# Patient Record
Sex: FEMALE | Race: AMERICAN INDIAN OR ALASKA NATIVE
[De-identification: names, ages, dates, MRNs, and addresses within clinical notes are randomized per-mention and may not be internally consistent; named-entity substitution may affect disease eponyms.]

---

## 2019-11-25 ENCOUNTER — HOSPITAL ENCOUNTER (EMERGENCY)
Dept: HOSPITAL 5 - ED | Age: 19
Discharge: HOME | End: 2019-11-25
Payer: MEDICAID

## 2019-11-25 VITALS — DIASTOLIC BLOOD PRESSURE: 80 MMHG | SYSTOLIC BLOOD PRESSURE: 110 MMHG
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
## 2019-11-25 DIAGNOSIS — S39.012A: Primary | ICD-10-CM
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
## 2019-11-25 DIAGNOSIS — Z79.899: ICD-10-CM

## 2019-11-25 DIAGNOSIS — N89.8: ICD-10-CM
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
## 2019-11-25 LAB
BACTERIA #/AREA URNS HPF: (no result) /HPF
PH UR STRIP: 7 [PH] (ref 5–7)
RBC #/AREA URNS HPF: 30 /HPF (ref 0–6)
UROBILINOGEN UR-MCNC: < 2 MG/DL (ref ?–2)
WBC #/AREA URNS HPF: 4 /HPF (ref 0–6)

## 2019-11-25 PROCEDURE — 81025 URINE PREGNANCY TEST: CPT

## 2019-11-25 PROCEDURE — 81001 URINALYSIS AUTO W/SCOPE: CPT

## 2019-11-25 NOTE — EMERGENCY DEPARTMENT REPORT
ED Female  HPI





- General


Chief complaint: Urogenital-Female


Stated complaint: VAGINAL DISCOMFORT/LOWER BACK


Time Seen by Provider: 11/25/19 19:46


Source: patient


Mode of arrival: Ambulatory


Limitations: No Limitations





- History of Present Illness


Initial comments: 





Charlotte is a very pleasant healthy 19-year-old female without significant past 

medical history who presents with vaginal irritation, foul-smelling urine and 

lower back pain.  The vaginal discomfort irritation began gradually 2-3 days 

ago.  She has a thick discharge.  Foul-smelling urine noticed today.  She had 

transient lower back pain this morning.  The pain is not persistent.  Back pain 

has since resolved.  Denies hematuria.  Denies pelvic pain.  Denies abdominal 

pain. Denies vomiting.  Denies fever.


MD Complaint: vaginal discharge


-: Gradual, days(s) (2-3)


Location: labia


Severity: mild


Quality: burning


Consistency: constant


Improves with: none


Worsens with: none


Associated Symptoms: vaginal discharge





- Related Data


                                  Previous Rx's











 Medication  Instructions  Recorded  Last Taken  Type


 


Fluconazole [Diflucan TAB] 150 mg PO ONCE #1 tablet 11/25/19 Unknown Rx


 


metroNIDAZOLE [Flagyl TAB] 500 mg PO Q12HR 7 Days #14 tab 11/25/19 Unknown Rx











                                    Allergies











Allergy/AdvReac Type Severity Reaction Status Date / Time


 


No Known Allergies Allergy   Verified 11/25/19 19:47














ED Review of Systems


ROS: 


Stated complaint: VAGINAL DISCOMFORT/LOWER BACK


Other details as noted in HPI





Comment: All other systems reviewed and negative


Constitutional: denies: fever, malaise


Respiratory: denies: cough


Cardiovascular: denies: chest pain


Gastrointestinal: denies: abdominal pain, nausea, vomiting


Genitourinary: discharge


Musculoskeletal: back pain





ED Past Medical Hx





- Past Medical History


Previous Medical History?: No





- Surgical History


Additional Surgical History: C SECTION





- Social History


Smoking Status: Never Smoker


Substance Use Type: None





- Medications


Home Medications: 


                                Home Medications











 Medication  Instructions  Recorded  Confirmed  Last Taken  Type


 


Fluconazole [Diflucan TAB] 150 mg PO ONCE #1 tablet 11/25/19  Unknown Rx


 


metroNIDAZOLE [Flagyl TAB] 500 mg PO Q12HR 7 Days #14 tab 11/25/19  Unknown Rx














ED Physical Exam





- General


Limitations: No Limitations


General appearance: alert, in no apparent distress





- Head


Head exam: Present: atraumatic, normocephalic





- Eye


Eye exam: Present: normal appearance





- ENT


ENT exam: Present: mucous membranes moist





- Neck


Neck exam: Present: normal inspection, full ROM





- Respiratory


Respiratory exam: Present: normal lung sounds bilaterally.  Absent: respiratory 

distress, wheezes, rales, rhonchi





- Cardiovascular


Cardiovascular Exam: Present: regular rate, normal rhythm, normal heart sounds. 

Absent: systolic murmur, diastolic murmur, rubs, gallop





- GI/Abdominal


GI/Abdominal exam: Present: soft, normal bowel sounds.  Absent: distended, te

nderness, guarding





- Extremities Exam


Extremities exam: Present: normal inspection





- Back Exam


Back exam: Present: normal inspection, full ROM.  Absent: tenderness, CVA 

tenderness (R), CVA tenderness (L), muscle spasm, paraspinal tenderness





- Neurological Exam


Neurological exam: Present: alert, oriented X3





- Psychiatric


Psychiatric exam: Present: normal affect, normal mood





- Skin


Skin exam: Present: warm, dry, intact, normal color.  Absent: rash





ED Course





                                   Vital Signs











  11/25/19





  19:46


 


Temperature 98.6 F


 


Pulse Rate 76


 


Respiratory 20





Rate 


 


Blood Pressure 110/80





[Right] 


 


O2 Sat by Pulse 99





Oximetry 














ED Medical Decision Making





- Medical Decision Making





1.  vaginitis, no indication of PID rx: fluconazole, metronidazole





2.  back pain:  lumbar strain, no pain currently





3.  Urine malodorous: UA negative for infection: contaminated


Critical care attestation.: 


If time is entered above; I have spent that time in minutes in the direct care 

of this critically ill patient, excluding procedure time.








ED Disposition


Clinical Impression: 


 Vaginitis, Lumbar strain





Disposition: DC-01 TO HOME OR SELFCARE


Is pt being admited?: No


Does the pt Need Aspirin: No


Condition: Stable


Instructions:  Vaginitis (ED)


Prescriptions: 


Fluconazole [Diflucan TAB] 150 mg PO ONCE #1 tablet


metroNIDAZOLE [Flagyl TAB] 500 mg PO Q12HR 7 Days #14 tab


Referrals: 


[facility] [Outside] - 3-5 Days


Forms:  Work/School Release Form(ED)

## 2019-11-25 NOTE — EVENT NOTE
ED Screening Note


ED Screening Note: 





lower back pain that began two to three days ago 


+vaginal discharge 


no dysuria 


no fever 


LNMP: 11/4/19





no pmhx 


no allergies to meds 





This initial assessment/diagnostic orders/clinical plan/treatment(s) is/are 

subject to change based on patients health status, clinical progression and re-

assessment by fellow clinical providers in the ED. Further treatment and workup 

at subsequent clinical providers discretion. Patient/guardian urged not to elope

from the ED as their condition may be serious if not clinically assessed and 

managed. 





Initial orders include: UA, urine preg

## 2021-06-19 ENCOUNTER — HOSPITAL ENCOUNTER (EMERGENCY)
Dept: HOSPITAL 5 - ED | Age: 21
Discharge: HOME | End: 2021-06-19
Payer: MEDICAID

## 2021-06-19 VITALS — SYSTOLIC BLOOD PRESSURE: 106 MMHG | DIASTOLIC BLOOD PRESSURE: 69 MMHG

## 2021-06-19 DIAGNOSIS — R10.84: Primary | ICD-10-CM

## 2021-06-19 DIAGNOSIS — Z79.899: ICD-10-CM

## 2021-06-19 DIAGNOSIS — Z98.890: ICD-10-CM

## 2021-06-19 LAB
ALBUMIN SERPL-MCNC: 4.5 G/DL (ref 3.9–5)
ALT SERPL-CCNC: 7 UNITS/L (ref 7–56)
BASOPHILS # (AUTO): 0 K/MM3 (ref 0–0.1)
BASOPHILS NFR BLD AUTO: 0.3 % (ref 0–1.8)
BILIRUB UR QL STRIP: (no result)
BLOOD UR QL VISUAL: (no result)
BUN SERPL-MCNC: 11 MG/DL (ref 7–17)
BUN/CREAT SERPL: 16 %
CALCIUM SERPL-MCNC: 9.3 MG/DL (ref 8.4–10.2)
EOSINOPHIL # BLD AUTO: 0 K/MM3 (ref 0–0.4)
EOSINOPHIL NFR BLD AUTO: 0.3 % (ref 0–4.3)
HCT VFR BLD CALC: 31.4 % (ref 30.3–42.9)
HEMOLYSIS INDEX: 0
HGB BLD-MCNC: 10.5 GM/DL (ref 10.1–14.3)
LYMPHOCYTES # BLD AUTO: 1.4 K/MM3 (ref 1.2–5.4)
LYMPHOCYTES NFR BLD AUTO: 18.5 % (ref 13.4–35)
MCHC RBC AUTO-ENTMCNC: 33 % (ref 30–34)
MCV RBC AUTO: 81 FL (ref 79–97)
MONOCYTES # (AUTO): 0.7 K/MM3 (ref 0–0.8)
MONOCYTES % (AUTO): 9 % (ref 0–7.3)
MUCOUS THREADS #/AREA URNS HPF: (no result) /HPF
PH UR STRIP: 5 [PH] (ref 5–7)
PLATELET # BLD: 291 K/MM3 (ref 140–440)
PROT UR STRIP-MCNC: (no result) MG/DL
RBC # BLD AUTO: 3.88 M/MM3 (ref 3.65–5.03)
RBC #/AREA URNS HPF: 3 /HPF (ref 0–6)
UROBILINOGEN UR-MCNC: 2 MG/DL (ref ?–2)
WBC #/AREA URNS HPF: 1 /HPF (ref 0–6)

## 2021-06-19 PROCEDURE — 83735 ASSAY OF MAGNESIUM: CPT

## 2021-06-19 PROCEDURE — 81025 URINE PREGNANCY TEST: CPT

## 2021-06-19 PROCEDURE — 81001 URINALYSIS AUTO W/SCOPE: CPT

## 2021-06-19 PROCEDURE — 85025 COMPLETE CBC W/AUTO DIFF WBC: CPT

## 2021-06-19 PROCEDURE — 36415 COLL VENOUS BLD VENIPUNCTURE: CPT

## 2021-06-19 PROCEDURE — 80053 COMPREHEN METABOLIC PANEL: CPT

## 2021-06-19 PROCEDURE — 83690 ASSAY OF LIPASE: CPT

## 2021-06-19 NOTE — EMERGENCY DEPARTMENT REPORT
ED General Adult HPI





- General


Chief complaint: Abdominal Pain


Stated complaint: ABD PAINS


Time Seen by Provider: 21 13:31


Source: patient


Mode of arrival: Ambulatory


Limitations: No Limitations





- History of Present Illness


Initial comments: 


20-year-old female patient (; LMP end of May 2021) presents to the emergency

department with complaints of abdominal pain starting yesterday.  Pain is 

diffuse and intermittent, radiating to both flanks.  No known sick contacts.  No

current steroid or antibiotic use.  Six days ago, patient took Plan B.  Patient 

also took Motrin yesterday with limited relief.  No history of prior abdominal 

surgeries.  Denies fever, chills, nausea, vomiting, diarrhea, constipation, 

vaginal discharge.  Denies all other complaints at this time.





- Related Data


                                  Previous Rx's











 Medication  Instructions  Recorded  Last Taken  Type


 


Fluconazole (Nf) [Diflucan TAB] 150 mg PO ONCE #1 tablet 19 Unknown Rx


 


metroNIDAZOLE [Flagyl TAB] 500 mg PO Q12HR 7 Days #14 tab 19 Unknown Rx











                                    Allergies











Allergy/AdvReac Type Severity Reaction Status Date / Time


 


No Known Allergies Allergy   Verified 19 19:47














ED Review of Systems


ROS: 


Stated complaint: ABD PAINS


Other details as noted in HPI





Other: 





GENERAL: Negative for fever. 


CARDIOVASCULAR: Negative for chest pain. 


PULMONARY: Negative for shortness of breath. 


GASTROINTESTINAL: Positive for abdominal pain. 


MUSCULOSKELETAL: Negative for back pain. 


NEUROLOGICAL: Negative for headache. 


INTEGUMENTARY: Negative for rash.





ED Past Medical Hx





- Past Medical History


Previous Medical History?: No





- Surgical History


Past Surgical History?: Yes


Additional Surgical History: C SECTION





- Social History


Smoking Status: Never Smoker


Substance Use Type: None





- Medications


Home Medications: 


                                Home Medications











 Medication  Instructions  Recorded  Confirmed  Last Taken  Type


 


Fluconazole (Nf) [Diflucan TAB] 150 mg PO ONCE #1 tablet 19  Unknown Rx


 


metroNIDAZOLE [Flagyl TAB] 500 mg PO Q12HR 7 Days #14 tab 19  Unknown Rx














ED Physical Exam





- General


Limitations: No Limitations





- Other


Other exam information: 





General: Awake and alert. No acute distress. 


Head: Atraumatic, normocephalic.


Eyes: EOMI. Pupils are equal and round. Normal sclera and conjunctiva. 


ENT: Oral mucosa is moist. Normal pharyngeal exam.


Neck: Supple. No lymphadenopathy.


Pulmonary: No respiratory distress. Clear to auscultation bilaterally. 


Cardiac: Regular rate and rhythm. Pulses are palpable and equal bilaterally. No 

lower extremity cyanosis or edema. 


Skin: Warm and dry. No rashes. 


Abdomen: Soft, non-protuberant.  Patient reports diffuse abdominal pain without 

reproducible localized tenderness.  No guarding, rigidity, or rebound. Bowel 

sounds are normal. No organomegaly or masses noted. 


Back: Normal alignment. No CVA tenderness.


Extremities: Symmetrical. Full range of motion intact. 


Neurological: Alert and oriented, appropriately interactive, no focal deficits.


Psych: Cooperative. Appropriate mood and affect. Speech is evenly metered. 

Thoughts are logically construed.





ED Course


                                   Vital Signs











  21





  13:23


 


Temperature 98.5 F


 


Pulse Rate 85


 


Respiratory 16





Rate 


 


Blood Pressure 106/69


 


O2 Sat by Pulse 99





Oximetry 














ED Medical Decision Making





- Lab Data


Result diagrams: 


                                 21 14:14





                                 21 14:14





- Medical Decision Making


Differential diagnosis including but not limited to: pregnancy, urinary tract 

infection, pyelonephritis, pancreatitis, appendicitis 





On reevaluation, patient remains stable. Repeat abdominal exam is benign. Labs 

and urinalysis are unremarkable. Pregnancy test is negative. She is afebrile, 

hemodynamically stable, tolerating oral intake without difficulty. No vomiting 

in the emergency department. Ambulatory without assistance. Etiology of 

patient's abdominal pain is unclear however there is no clinical indication for 

further diagnostic work-up on an emergent basis at this time. Patient will be 

discharged home to continue symptomatic treatment and referred to primary care 

provider for close outpatient follow-up. Patient expressed understanding and is 

agreeable to plan of care. Strict return precautions provided.





Repeat exam is unremarkable and benign. History, exam, diagnostic testing, and 

current condition do not suggest worrisome pathology to warrant further testing,

continued ED treatment, admission, or surgical evaluation at this point. Given 

the low probability of a significant medical illness, it would be more likely to

result in harm than benefit to perform further testing at this stage. Discussed 

findings, presumptive diagnosis, need for follow-up and specific signs/symptoms 

that should prompt immediate return to the emergency department. Instructions 

were explained in detail to the patient in addition to giving written discharge 

information. Patient expressed understanding and was given the opportunity to 

ask questions, all of which were satisfactorily answered prior to discharge 

home.





Critical care attestation.: 


If time is entered above; I have spent that time in minutes in the direct care 

of this critically ill patient, excluding procedure time.








ED Disposition


Clinical Impression: 


 Nonspecific abdominal pain





Disposition: DC-01 TO HOME OR SELFCARE


Is pt being admited?: No


Does the pt Need Aspirin: No


Condition: Stable


Instructions:  Abdominal Pain, Adult, Easy-to-Read, Abdominal Pain (ED)


Additional Instructions: 


Take Tylenol every 4 hours and Motrin every 8 hours as needed for pain.


Rest.  Drink plenty of fluids.


Maintain a healthy diet.


Follow-up with primary care provider this week.  Call Monday to schedule an 

appointment.  See referral information below.


Return to the emergency department immediately for new or worsening symptoms.


Specifically, return to the emergency department immediately for fever, 

vomiting, black/bloody stools, or if pain localizes to the right lower part of 

your abdomen.


Referrals: 


[facility] [place] [Primary Care Provider] - 3-5 Days


Time of Disposition: 15:38

## 2022-08-04 ENCOUNTER — HOSPITAL ENCOUNTER (EMERGENCY)
Dept: HOSPITAL 5 - ED | Age: 22
Discharge: HOME | End: 2022-08-04
Payer: MEDICAID

## 2022-08-04 VITALS — SYSTOLIC BLOOD PRESSURE: 133 MMHG | DIASTOLIC BLOOD PRESSURE: 93 MMHG

## 2022-08-04 DIAGNOSIS — R10.30: Primary | ICD-10-CM

## 2022-08-04 LAB
ALBUMIN SERPL-MCNC: 4.5 G/DL (ref 3.9–5)
ALT SERPL-CCNC: 17 UNITS/L (ref 7–56)
BUN SERPL-MCNC: 14 MG/DL (ref 7–17)
BUN/CREAT SERPL: 18 %
CALCIUM SERPL-MCNC: 9.5 MG/DL (ref 8.4–10.2)
HCT VFR BLD CALC: 40.3 % (ref 30.3–42.9)
HEMOLYSIS INDEX: 84
HGB BLD-MCNC: 13 GM/DL (ref 10.1–14.3)
MCHC RBC AUTO-ENTMCNC: 32 % (ref 30–34)
MCV RBC AUTO: 84 FL (ref 79–97)
PH UR STRIP: 8 [PH] (ref 5–7)
PLATELET # BLD: 296 K/MM3 (ref 140–440)
PROT UR STRIP-MCNC: (no result) MG/DL
RBC # BLD AUTO: 4.78 M/MM3 (ref 3.65–5.03)
UROBILINOGEN UR-MCNC: 0 MG/DL (ref ?–2)

## 2022-08-04 PROCEDURE — 99283 EMERGENCY DEPT VISIT LOW MDM: CPT

## 2022-08-04 PROCEDURE — 81025 URINE PREGNANCY TEST: CPT

## 2022-08-04 PROCEDURE — 80053 COMPREHEN METABOLIC PANEL: CPT

## 2022-08-04 PROCEDURE — 83690 ASSAY OF LIPASE: CPT

## 2022-08-04 PROCEDURE — 85027 COMPLETE CBC AUTOMATED: CPT

## 2022-08-04 PROCEDURE — 36415 COLL VENOUS BLD VENIPUNCTURE: CPT

## 2022-08-04 PROCEDURE — 81001 URINALYSIS AUTO W/SCOPE: CPT

## 2022-08-04 NOTE — EMERGENCY DEPARTMENT REPORT
ED Abdominal Pain HPI





- General


Chief Complaint: Abdominal Pain


Stated Complaint: STOMACH PAIN


Time Seen by Provider: 08/04/22 20:20


Source: patient


Mode of arrival: Ambulatory


Limitations: No Limitations





- History of Present Illness


Initial Comments: 





pt is a 22 y/o aaf with nmn who presents of bilat lower abd pain  3/10 x 3 days 

, pt denies n/v no fever or chills no dysuria , frequency or urgency , no 

vaginal discharge, LMP 1 month ago, pt having normal bowel movements. . pt is 

tolerating po intake.  symptoms are exacerbated by nothing, symptoms are 

relieved by nothing, there is no back pain , pt denies fall injury or trauma.  


MD Complaint: abdominal pain





- Related Data


                                  Previous Rx's











 Medication  Instructions  Recorded  Last Taken  Type


 


Fluconazole (Nf) [Diflucan TAB] 150 mg PO ONCE #1 tablet 11/25/19 Unknown Rx


 


metroNIDAZOLE [Flagyl TAB] 500 mg PO Q12HR 7 Days #14 tab 11/25/19 Unknown Rx


 


Ibuprofen [Motrin 800 MG tab] 800 mg PO Q8HR PRN #30 tablet 08/04/22 Unknown Rx


 


Ondansetron [Zofran Odt] 4 mg PO Q8HR #12 tab.rapdis 08/04/22 Unknown Rx











                                    Allergies











Allergy/AdvReac Type Severity Reaction Status Date / Time


 


No Known Allergies Allergy   Verified 08/04/22 16:18














ED Review of Systems


ROS: 


Stated complaint: STOMACH PAIN


Other details as noted in HPI





Constitutional: denies: chills, fever


Eyes: denies: eye pain, eye discharge, vision change


ENT: denies: ear pain, throat pain


Respiratory: denies: cough, shortness of breath, wheezing


Cardiovascular: denies: chest pain, palpitations


Endocrine: no symptoms reported


Gastrointestinal: abdominal pain.  denies: nausea, vomiting, diarrhea, 

constipation, hematemesis, melena, hematochezia


Genitourinary: denies: urgency, dysuria, frequency, hematuria, discharge, 

dyspareunia


Musculoskeletal: denies: back pain, joint swelling, arthralgia


Skin: as per HPI


Neurological: denies: headache, weakness, paresthesias, vertigo


Psychiatric: denies: anxiety, depression


Hematological/Lymphatic: denies: easy bleeding, easy bruising





ED Past Medical Hx





- Surgical History


Additional Surgical History: C SECTION





- Social History


Smoking Status: Never Smoker


Substance Use Type: None





- Medications


Home Medications: 


                                Home Medications











 Medication  Instructions  Recorded  Confirmed  Last Taken  Type


 


Fluconazole (Nf) [Diflucan TAB] 150 mg PO ONCE #1 tablet 11/25/19  Unknown Rx


 


metroNIDAZOLE [Flagyl TAB] 500 mg PO Q12HR 7 Days #14 tab 11/25/19  Unknown Rx


 


Ibuprofen [Motrin 800 MG tab] 800 mg PO Q8HR PRN #30 tablet 08/04/22  Unknown Rx


 


Ondansetron [Zofran Odt] 4 mg PO Q8HR #12 tab.rapdis 08/04/22  Unknown Rx














ED Physical Exam





- General


Limitations: No Limitations


General appearance: alert, in no apparent distress





- Head


Head exam: Present: normocephalic, normal inspection





- Eye


Eye exam: Present: EOMI


Pupils: Present: normal accommodation





- ENT


ENT exam: Present: mucous membranes moist





- Neck


Neck exam: Present: normal inspection, full ROM.  Absent: tenderness





- Respiratory


Respiratory exam: Present: normal lung sounds bilaterally.  Absent: respiratory 

distress, wheezes





- Cardiovascular


Cardiovascular Exam: Present: regular rate, normal rhythm, normal heart sounds. 

Absent: systolic murmur, diastolic murmur, rubs, gallop





- GI/Abdominal


GI/Abdominal exam: Present: soft, normal bowel sounds.  Absent: distended, 

tenderness, guarding, rebound, rigid, bruit, hernia





- Rectal


Rectal exam: Present: deferred





- Extremities Exam


Extremities exam: Present: normal inspection, full ROM, normal capillary refill.

 Absent: tenderness, pedal edema





- Back Exam


Back exam: Present: normal inspection, full ROM.  Absent: CVA tenderness (R), C

VA tenderness (L)





- Neurological Exam


Neurological exam: Present: alert, oriented X3, CN II-XII intact, normal gait





- Expanded Neurological Exam


  ** Expanded


Patient oriented to: Present: person, place, time


Speech: Present: fluid speech


Motor strength exam: RUE: 5, LUE: 5, RLE: 5, LLE: 5


Best Eye Response ([place]): (4) open spontaneously


Best Motor Response ([place]): (6) obeys commands


Best Verbal Response ([place]): (5) oriented


[place] Total: 15





- Psychiatric


Psychiatric exam: Present: normal affect, normal mood





- Skin


Skin exam: Present: warm, dry, intact, normal color.  Absent: rash





ED Course





                                   Vital Signs











  08/04/22





  16:18


 


Temperature 98.3 F


 


Pulse Rate 71


 


Respiratory 18





Rate 


 


Blood Pressure 133/93





[Left] 


 


O2 Sat by Pulse 100





Oximetry 














ED Medical Decision Making





- Lab Data


Result diagrams: 


                                 08/04/22 17:28





                                 08/04/22 17:28








Labs











  08/04/22 08/04/22 08/04/22





  16:18 17:28 17:28


 


WBC   6.4 


 


RBC   4.78 


 


Hgb   13.0 


 


Hct   40.3 


 


MCV   84 


 


MCH   27 L 


 


MCHC   32 


 


RDW   16.6 H 


 


Plt Count   296 


 


Sodium    136 L


 


Potassium    4.5


 


Chloride    99.3


 


Carbon Dioxide    23


 


Anion Gap    18


 


BUN    14


 


Creatinine    0.8


 


Estimated GFR    > 60


 


BUN/Creatinine Ratio    18


 


Glucose    88


 


Calcium    9.5


 


Total Bilirubin    0.80


 


AST    27


 


ALT    17


 


Alkaline Phosphatase    69


 


Total Protein    8.3 H


 


Albumin    4.5


 


Albumin/Globulin Ratio    1.2


 


Lipase    33


 


Urine Color  Yellow  


 


Urine Turbidity  Clear  


 


Urine pH  8.0 H  


 


Ur Specific Gravity  1.010  


 


Urine Protein  <15 mg/dl  


 


Urine Glucose (UA)  Negative  


 


Urine Ketones  Negative  


 


Urine Blood  Negative  


 


Urine Nitrite  Negative  


 


Urine Bilirubin  Negative  


 


Urine Urobilinogen  0.0  


 


Ur Leukocyte Esterase  Negative  


 


Urine HCG, Qual  Negative  














- Medical Decision Making


 labs normal as noted above hcg neg,, pt is tolerating po intake without n/v,  

the is no abd tenderness, no vaginal discharge or bleeding exam is normal. v/s 

normal pt does not have an emergency medicine condition at this time.  plan dc 

to home otc nsaids prn pain , pt verbalized understanding and agreement with 

same, pt dc'd to home in stable condition at this time. 





Critical care attestation.: 


If time is entered above; I have spent that time in minutes in the direct care 

of this critically ill patient, excluding procedure time.








ED Disposition


Clinical Impression: 


Abdominal pain


Qualifiers:


 Abdominal location: lower abdomen, unspecified Qualified Code(s): R10.30 - 

Lower abdominal pain, unspecified





Disposition: 01 HOME / SELF CARE / HOMELESS


Is pt being admited?: No


Does the pt Need Aspirin: No


Condition: Stable


Instructions:  Abdominal Pain (ED), Abdominal Pain, Adult, Easy-to-Read


Additional Instructions: 


take ibuprofen as needed for pain, hyrate as directed follow up with your doctor

 in 2-3 days , return to emergency if symptoms worsen. 


Prescriptions: 


Ibuprofen [Motrin 800 MG tab] 800 mg PO Q8HR PRN #30 tablet


 PRN Reason: pain


Ondansetron [Zofran Odt] 4 mg PO Q8HR #12 tab.rapdis


Referrals: 


SHANA BROOKS MD [Staff Physician] - 3-5 Days


Forms:  Work/School Release Form(ED)


Time of Disposition: 20:36